# Patient Record
Sex: FEMALE | Race: WHITE | NOT HISPANIC OR LATINO | Employment: FULL TIME | URBAN - NONMETROPOLITAN AREA
[De-identification: names, ages, dates, MRNs, and addresses within clinical notes are randomized per-mention and may not be internally consistent; named-entity substitution may affect disease eponyms.]

---

## 2017-03-02 ENCOUNTER — OFFICE VISIT (OUTPATIENT)
Dept: URGENT CARE | Facility: PHYSICIAN GROUP | Age: 32
End: 2017-03-02
Payer: OTHER MISCELLANEOUS

## 2017-03-02 VITALS
HEART RATE: 76 BPM | DIASTOLIC BLOOD PRESSURE: 70 MMHG | WEIGHT: 156 LBS | TEMPERATURE: 98.7 F | SYSTOLIC BLOOD PRESSURE: 110 MMHG | BODY MASS INDEX: 25.07 KG/M2 | OXYGEN SATURATION: 96 % | RESPIRATION RATE: 16 BRPM | HEIGHT: 66 IN

## 2017-03-02 DIAGNOSIS — J40 BRONCHITIS: ICD-10-CM

## 2017-03-02 PROCEDURE — 99202 OFFICE O/P NEW SF 15 MIN: CPT | Performed by: NURSE PRACTITIONER

## 2017-03-02 RX ORDER — AZITHROMYCIN 250 MG/1
TABLET, FILM COATED ORAL
Qty: 6 TAB | Refills: 0 | Status: SHIPPED | OUTPATIENT
Start: 2017-03-02

## 2017-03-02 RX ORDER — CODEINE PHOSPHATE AND GUAIFENESIN 10; 100 MG/5ML; MG/5ML
5-10 SOLUTION ORAL EVERY 6 HOURS PRN
Qty: 90 ML | Refills: 0 | Status: SHIPPED | OUTPATIENT
Start: 2017-03-02

## 2017-03-02 ASSESSMENT — ENCOUNTER SYMPTOMS
SPUTUM PRODUCTION: 1
FEVER: 0
MYALGIAS: 0
SHORTNESS OF BREATH: 0
RHINORRHEA: 1
VOMITING: 0
NAUSEA: 0
COUGH: 1
DIARRHEA: 0
WEAKNESS: 0
SORE THROAT: 0
CHILLS: 0

## 2017-03-02 ASSESSMENT — COPD QUESTIONNAIRES: COPD: 0

## 2017-03-02 NOTE — MR AVS SNAPSHOT
"Christine Mae   3/2/2017 4:40 PM   Office Visit   MRN: [id number]    Department:  [facility]   Dept Phone:  [phone]    Description:  Female : 1985   Provider:  JACK Farrell           Reason for Visit     Cough productive      Allergies as of 3/2/2017     Allergen Noted Reactions    Erythromycin 2017         You were diagnosed with     Bronchitis   [[id number]]         Vital Signs     Blood Pressure Pulse Temperature Respirations Height Weight    110/70 mmHg 76 37.1 °C (98.7 °F) 16 1.676 m (5' 6\") 70.761 kg (156 lb)    Body Mass Index Oxygen Saturation Smoking Status             25.19 kg/m2 96% Former Smoker         Basic Information     Date Of Birth Sex Race Ethnicity Preferred Language    1985 Female White Non- English      Health Maintenance     Patient has no pending health maintenance at this time      Current Immunizations     No immunizations on file.      Below and/or attached are the medications your provider expects you to take. Review all of your home medications and newly ordered medications with your provider and/or pharmacist. Follow medication instructions as directed by your provider and/or pharmacist. Please keep your medication list with you and share with your provider. Update the information when medications are discontinued, doses are changed, or new medications (including over-the-counter products) are added; and carry medication information at all times in the event of emergency situations     Allergies:  ERYTHROMYCIN - (reactions not documented)               Medications  Valid as of: 2017 -  5:16 PM    Generic Name Brand Name Tablet Size Instructions for use    Azithromycin (Tab) ZITHROMAX 250 MG Take as directed        Guaifenesin-Codeine (Solution) ROBITUSSIN -10 mg/5mL Take 5-10 mL by mouth every 6 hours as needed for Cough.        .                 Medicines prescribed today were sent to:     [id number] " - [place], [place] - [address] [facility] [address] [facility]    [address] [place] [address]    Phone: [phone] Fax: [phone]    Open 24 Hours?: No      Medication refill instructions:       If your prescription bottle indicates you have medication refills left, it is not necessary to call your provider’s office. Please contact your pharmacy and they will refill your medication.    If your prescription bottle indicates you do not have any refills left, you may request refills at any time through one of the following ways: The online Adskom system (except Urgent Care), by calling your provider’s office, or by asking your pharmacy to contact your provider’s office with a refill request. Medication refills are processed only during regular business hours and may not be available until the next business day. Your provider may request additional information or to have a follow-up visit with you prior to refilling your medication.   *Please Note: Medication refills are assigned a new Rx number when refilled electronically. Your pharmacy may indicate that no refills were authorized even though a new prescription for the same medication is available at the pharmacy. Please request the medicine by name with the pharmacy before contacting your provider for a refill.           A[employer] Access Code: [email or web address]  Expires: 4/1/2017  5:16 PM    A[employer]kom  A secure, online tool to manage your health information     [employer]’s Adskom® is a secure, online tool that connects you to your personalized health information from the privacy of your home -- day or night - making it very easy for you to manage your healthcare. Once the activation process is completed, you can even access your medical information using the Adskom rayne, which is available for free in the Apple Rayne store or Google Play store.     Adskom provides the following levels of access (as shown below):   My Chart Features   [facility]  Primary Care Doctor [facility]  Specialists [facility]  Urgent  Care Non-[facility]  Primary Care  Doctor   Email your healthcare team securely and privately 24/7 X X X    Manage appointments: schedule your next appointment; view details of past/upcoming appointments X      Request prescription refills. X      View recent personal medical records, including lab and immunizations X X X X   View health record, including health history, allergies, medications X X X X   Read reports about your outpatient visits, procedures, consult and ER notes X X X X   See your discharge summary, which is a recap of your hospital and/or ER visit that includes your diagnosis, lab results, and care plan. X X       How to register for VYRE Limited:  1. Go to  [email or web address].  2. Click on the Sign Up Now box, which takes you to the New Member Sign Up page. You will need to provide the following information:  a. Enter your VYRE Limited Access Code exactly as it appears at the top of this page. (You will not need to use this code after you’ve completed the sign-up process. If you do not sign up before the expiration date, you must request a new code.)   b. Enter your date of birth.   c. Enter your home email address.   d. Click Submit, and follow the next screen’s instructions.  3. Create a VYRE Limited ID. This will be your VYRE Limited login ID and cannot be changed, so think of one that is secure and easy to remember.  4. Create a VYRE Limited password. You can change your password at any time.  5. Enter your Password Reset Question and Answer. This can be used at a later time if you forget your password.   6. Enter your e-mail address. This allows you to receive e-mail notifications when new information is available in VYRE Limited.  7. Click Sign Up. You can now view your health information.    For assistance activating your VYRE Limited account, call [phone]

## 2017-03-03 NOTE — PROGRESS NOTES
"Subjective:      Christine Mae is a 31 y.o. female who presents with Cough            HPI Comments: Medications, Allergies and Prior Medical Hx reviewed and updated in [facility].with patient/family today         Cough  This is a new problem. The current episode started more than 1 month ago. The problem has been waxing and waning. Associated symptoms include rhinorrhea. Pertinent negatives include no chills, ear pain, fever, myalgias, sore throat or shortness of breath. She has tried OTC cough suppressant for the symptoms. The treatment provided mild relief. There is no history of asthma, COPD or emphysema.       Review of Systems   Constitutional: Positive for malaise/fatigue. Negative for fever and chills.   HENT: Positive for congestion and rhinorrhea. Negative for ear discharge, ear pain and sore throat.    Respiratory: Positive for cough and sputum production. Negative for shortness of breath.    Gastrointestinal: Negative for nausea, vomiting and diarrhea.   Musculoskeletal: Negative for myalgias.   Neurological: Negative for weakness.          Objective:     /70 mmHg  Pulse 76  Temp(Src) 37.1 °C (98.7 °F)  Resp 16  Ht 1.676 m (5' 6\")  Wt 70.761 kg (156 lb)  BMI 25.19 kg/m2  SpO2 96%     Physical Exam   Constitutional: She appears well-developed and well-nourished. No distress.   HENT:   Head: Normocephalic and atraumatic.   Right Ear: Tympanic membrane and ear canal normal.   Left Ear: Tympanic membrane and ear canal normal.   Nose: Rhinorrhea present.   Mouth/Throat: Uvula is midline and mucous membranes are normal. Posterior oropharyngeal edema and posterior oropharyngeal erythema present. No oropharyngeal exudate.   Eyes: Conjunctivae are normal. Pupils are equal, round, and reactive to light.   Neck: Neck supple.   Cardiovascular: Normal rate, regular rhythm and normal heart sounds.    Pulmonary/Chest: Effort normal and breath sounds normal. No respiratory distress. She has no wheezes. "   Lymphadenopathy:     She has cervical adenopathy.   Neurological: She is alert.   Skin: Skin is warm and dry.   Psychiatric: She has a normal mood and affect. Her behavior is normal.   Vitals reviewed.              Assessment/Plan:     1. Bronchitis  guaifenesin-codeine (CHERATUSSIN AC) Solution oral solution    azithromycin (ZITHROMAX) 250 MG Tab       Do not drink alcohol or operate machinery with this medication  Pt reviewed on [place]  Aware,  no remarkable controlled substance prescription documentation noted    Warm compresses to eyes, good hand washing, change towels, washcloths, pillow cases, and eye makeup  Do not wear contacts until sx resolve  Pt will go to the ER for worsening or changing symptoms as discussed, pain, pain with eye movement, redness of eyelids or surrounding area, or vision changes  Follow-up with your primary care provider or return here if not improving in 2-3 days   Discharge instructions discussed with pt/family who verbalize understanding and agreement with poc

## 2017-05-08 ENCOUNTER — OFFICE VISIT (OUTPATIENT)
Dept: URGENT CARE | Facility: PHYSICIAN GROUP | Age: 32
End: 2017-05-08
Payer: OTHER MISCELLANEOUS

## 2017-05-08 VITALS
BODY MASS INDEX: 24.22 KG/M2 | WEIGHT: 150 LBS | HEART RATE: 76 BPM | RESPIRATION RATE: 14 BRPM | OXYGEN SATURATION: 98 % | TEMPERATURE: 98.7 F | SYSTOLIC BLOOD PRESSURE: 110 MMHG | DIASTOLIC BLOOD PRESSURE: 70 MMHG

## 2017-05-08 DIAGNOSIS — G47.00 INSOMNIA, UNSPECIFIED TYPE: ICD-10-CM

## 2017-05-08 DIAGNOSIS — H69.93 EUSTACHIAN TUBE DYSFUNCTION, BILATERAL: Primary | ICD-10-CM

## 2017-05-08 DIAGNOSIS — J30.9 ALLERGIC RHINITIS, UNSPECIFIED ALLERGIC RHINITIS TRIGGER, UNSPECIFIED RHINITIS SEASONALITY: ICD-10-CM

## 2017-05-08 PROCEDURE — 99214 OFFICE O/P EST MOD 30 MIN: CPT | Performed by: PHYSICIAN ASSISTANT

## 2017-05-08 RX ORDER — METHYLPREDNISOLONE 4 MG/1
4 TABLET ORAL SEE ADMIN INSTRUCTIONS
Qty: 21 TAB | Refills: 0 | Status: SHIPPED | OUTPATIENT
Start: 2017-05-08

## 2017-05-08 RX ORDER — HYDROXYZINE HYDROCHLORIDE 25 MG/1
25 TABLET, FILM COATED ORAL 3 TIMES DAILY PRN
Qty: 30 TAB | Refills: 0 | Status: SHIPPED | OUTPATIENT
Start: 2017-05-08

## 2017-05-08 NOTE — MR AVS SNAPSHOT
Christine Mae   2017 5:10 PM   Office Visit   MRN: [id number]    Department:  [facility]   Dept Phone:  [phone]    Description:  Female : 1985   Provider:  Horace Gaming PA-C           Reason for Visit     URI Cold Sx      Allergies as of 2017     Allergen Noted Reactions    Erythromycin 2017         You were diagnosed with     Eustachian tube dysfunction, bilateral   [[id number]]  -  Primary     Allergic rhinitis, unspecified allergic rhinitis trigger, unspecified rhinitis seasonality   [[id number]]       Insomnia, unspecified type   [[id number]]         Vital Signs     Blood Pressure Pulse Temperature Respirations Weight Oxygen Saturation    110/70 mmHg 76 37.1 °C (98.7 °F) 14 68.04 kg (150 lb) 98%    Smoking Status                   Former Smoker           Basic Information     Date Of Birth Sex Race Ethnicity Preferred Language    1985 Female White Non- English      Health Maintenance        Date Due Completion Dates    IMM DTaP/Tdap/Td Vaccine (1 - Tdap) 2006 ---    PAP SMEAR 2008 ---            Current Immunizations     No immunizations on file.      Below and/or attached are the medications your provider expects you to take. Review all of your home medications and newly ordered medications with your provider and/or pharmacist. Follow medication instructions as directed by your provider and/or pharmacist. Please keep your medication list with you and share with your provider. Update the information when medications are discontinued, doses are changed, or new medications (including over-the-counter products) are added; and carry medication information at all times in the event of emergency situations     Allergies:  ERYTHROMYCIN - (reactions not documented)               Medications  Valid as of: May 08, 2017 -  5:48 PM    Generic Name Brand Name Tablet Size Instructions for use    Azithromycin (Tab) ZITHROMAX 250 MG Take as directed        Guaifenesin-Codeine (Solution) ROBITUSSIN -10 mg/5mL Take 5-10 mL by mouth every 6 hours as needed for Cough.        HydrOXYzine HCl (Tab) ATARAX 25 MG Take 1 Tab by mouth 3 times a day as needed for Anxiety (and sleep).        MethylPREDNISolone (Tablet Therapy Pack) MEDROL DOSEPAK 4 MG Take 1 Tab by mouth See Admin Instructions.        .                 Medicines prescribed today were sent to:     [facility] [address] [place] [address], [place] - [address] [facility] [address] [place]    [address] [facility] [address] [facility] [place] [address]    Phone: [phone] Fax: [phone]    Open 24 Hours?: No      Medication refill instructions:       If your prescription bottle indicates you have medication refills left, it is not necessary to call your provider’s office. Please contact your pharmacy and they will refill your medication.    If your prescription bottle indicates you do not have any refills left, you may request refills at any time through one of the following ways: The online Nanushka system (except Urgent Care), by calling your provider’s office, or by asking your pharmacy to contact your provider’s office with a refill request. Medication refills are processed only during regular business hours and may not be available until the next business day. Your provider may request additional information or to have a follow-up visit with you prior to refilling your medication.   *Please Note: Medication refills are assigned a new Rx number when refilled electronically. Your pharmacy may indicate that no refills were authorized even though a new prescription for the same medication is available at the pharmacy. Please request the medicine by name with the pharmacy before contacting your provider for a refill.           Nanushka Access Code: [email or web address]  Expires: 6/7/2017  5:48 PM    Nanushka  A secure, online tool to manage your health information     [employer]’s Nanushka® is a secure, online tool that  connects you to your personalized health information from the privacy of your home -- day or night - making it very easy for you to manage your healthcare. Once the activation process is completed, you can even access your medical information using the Managed by Q rayne, which is available for free in the Apple Rayne store or Google Play store.     Managed by Q provides the following levels of access (as shown below):   My Chart Features   [facility] Primary Care Doctor [facility]  Specialists [facility]  Urgent  Care Non-[facility]  Primary Care  Doctor   Email your healthcare team securely and privately 24/7 X X X    Manage appointments: schedule your next appointment; view details of past/upcoming appointments X      Request prescription refills. X      View recent personal medical records, including lab and immunizations X X X X   View health record, including health history, allergies, medications X X X X   Read reports about your outpatient visits, procedures, consult and ER notes X X X X   See your discharge summary, which is a recap of your hospital and/or ER visit that includes your diagnosis, lab results, and care plan. X X       How to register for Managed by Q:  1. Go to  [email or web address].  2. Click on the Sign Up Now box, which takes you to the New Member Sign Up page. You will need to provide the following information:  a. Enter your Managed by Q Access Code exactly as it appears at the top of this page. (You will not need to use this code after you’ve completed the sign-up process. If you do not sign up before the expiration date, you must request a new code.)   b. Enter your date of birth.   c. Enter your home email address.   d. Click Submit, and follow the next screen’s instructions.  3. Create a Managed by Q ID. This will be your Managed by Q login ID and cannot be changed, so think of one that is secure and easy to remember.  4. Create a Managed by Q password. You can change your password at any time.  5. Enter your Password Reset Question  and Answer. This can be used at a later time if you forget your password.   6. Enter your e-mail address. This allows you to receive e-mail notifications when new information is available in VendAsta.  7. Click Sign Up. You can now view your health information.    For assistance activating your VendAsta account, call [phone]

## 2017-05-09 NOTE — PROGRESS NOTES
Chief Complaint   Patient presents with   • URI     Cold Sx       HISTORY OF PRESENT ILLNESS: Patient is a 31 y.o. female who presents today because she has 2 complaints.    #1. She has a history of insomnia. She is not sure what medication she was given by her primary care provider in [place], but she is out of it and she is having difficulty sleeping. She is having difficulty getting to sleep.    #2. She has nasal congestion, mild cough, mild sore throat, significant bilateral ear pain and plugging and popping sensation. She has not been taking anything for her symptoms. Denies any fevers, chills, nausea, vomiting or diarrhea.    There are no active problems to display for this patient.      Allergies:Erythromycin    Current Outpatient Prescriptions Ordered in [facility]   Medication Sig Dispense Refill   • MethylPREDNISolone (MEDROL DOSEPAK) 4 MG Tablet Therapy Pack Take 1 Tab by mouth See Admin Instructions. 21 Tab 0   • hydrOXYzine (ATARAX) 25 MG Tab Take 1 Tab by mouth 3 times a day as needed for Anxiety (and sleep). 30 Tab 0   • guaifenesin-codeine (CHERATUSSIN AC) Solution oral solution Take 5-10 mL by mouth every 6 hours as needed for Cough. 90 mL 0   • azithromycin (ZITHROMAX) 250 MG Tab Take as directed 6 Tab 0     No current Epic-ordered facility-administered medications on file.       No past medical history on file.    Social History   Substance Use Topics   • Smoking status: Former Smoker   • Smokeless tobacco: Never Used   • Alcohol Use: Yes       No family status information on file.   No family history on file.    ROS:  Review of Systems   Constitutional: Negative for fever, chills, weight loss and malaise/fatigue.   HENT: Positive for ear pain, no nosebleeds, positive for nasal and sinus congestion, sore throat and no neck pain.    Eyes: Negative for blurred vision.   Respiratory: Positive for minimal cough, no sputum production, shortness of breath and wheezing.    Cardiovascular: Negative for chest  pain, palpitations, orthopnea and leg swelling.   Gastrointestinal: Negative for heartburn, nausea, vomiting and abdominal pain.   Genitourinary: Negative for dysuria, urgency and frequency.     Exam:  Blood pressure 110/70, pulse 76, temperature 37.1 °C (98.7 °F), resp. rate 14, weight 68.04 kg (150 lb), SpO2 98 %.  General:  Well nourished, well developed female in NAD  Head:Normocephalic, atraumatic  Eyes: PERRLA, EOM within normal limits, no conjunctival injection, no scleral icterus, visual fields and acuity grossly intact.  Ears: Normal shape and symmetry, no tenderness, no discharge. External canals are without any significant edema or erythema. Tympanic membranes are without any inflammation, leg amount of cloudy fluid behind the tympanic membranes bilaterally. Gross auditory acuity is intact  Nose: Symmetrical without tenderness, no discharge. Nasal mucosa is pale and edematous bilaterally  Mouth: reasonable hygiene, no erythema exudates or tonsillar enlargement.  Neck: no masses, range of motion within normal limits, no tracheal deviation. No obvious thyroid enlargement.  Pulmonary: chest is symmetrical with respiration, no wheezes, crackles, or rhonchi.  Cardiovascular: regular rate and rhythm without murmurs, rubs, or gallops.  Extremities: no clubbing, cyanosis, or edema.    Please note that this dictation was created using voice recognition software. I have made every reasonable attempt to correct obvious errors, but I expect that there are errors of grammar and possibly content that I did not discover before finalizing the note.    Assessment/Plan:  1. Eustachian tube dysfunction, bilateral  MethylPREDNISolone (MEDROL DOSEPAK) 4 MG Tablet Therapy Pack   2. Allergic rhinitis, unspecified allergic rhinitis trigger, unspecified rhinitis seasonality  MethylPREDNISolone (MEDROL DOSEPAK) 4 MG Tablet Therapy Pack   3. Insomnia, unspecified type  hydrOXYzine (ATARAX) 25 MG Tab   otc zyrtec D    Followup with  primary care in the next 7-10 days if not significantly improving, return to the urgent care or go to the emergency room sooner for any worsening of symptoms.

## 2017-11-01 ENCOUNTER — OFFICE VISIT (OUTPATIENT)
Dept: URGENT CARE | Facility: PHYSICIAN GROUP | Age: 32
End: 2017-11-01
Payer: OTHER MISCELLANEOUS

## 2017-11-01 VITALS
BODY MASS INDEX: 26.26 KG/M2 | WEIGHT: 163.4 LBS | RESPIRATION RATE: 20 BRPM | OXYGEN SATURATION: 98 % | SYSTOLIC BLOOD PRESSURE: 138 MMHG | HEART RATE: 118 BPM | DIASTOLIC BLOOD PRESSURE: 82 MMHG | TEMPERATURE: 98.7 F | HEIGHT: 66 IN

## 2017-11-01 DIAGNOSIS — O21.0 HYPEREMESIS GRAVIDARUM: ICD-10-CM

## 2017-11-01 PROCEDURE — 99202 OFFICE O/P NEW SF 15 MIN: CPT | Performed by: FAMILY MEDICINE

## 2017-11-01 RX ORDER — ONDANSETRON 4 MG/1
4 TABLET, FILM COATED ORAL EVERY 4 HOURS PRN
Qty: 45 TAB | Refills: 1 | Status: SHIPPED | OUTPATIENT
Start: 2017-11-01

## 2017-11-01 RX ORDER — BUPROPION HYDROCHLORIDE 100 MG/1
100 TABLET ORAL 2 TIMES DAILY
COMMUNITY

## 2017-11-01 RX ORDER — DOXYLAMINE SUCCINATE AND PYRIDOXINE HYDROCHLORIDE, DELAYED RELEASE TABLETS 10 MG/10 MG 10; 10 MG/1; MG/1
1-2 TABLET, DELAYED RELEASE ORAL EVERY 4 HOURS PRN
Qty: 60 TAB | Refills: 0 | Status: SHIPPED | OUTPATIENT
Start: 2017-11-01

## 2017-11-01 ASSESSMENT — ENCOUNTER SYMPTOMS
NAUSEA: 1
FEVER: 0
COUGH: 0
SHORTNESS OF BREATH: 0
WHEEZING: 0
BLOOD IN STOOL: 0
ABDOMINAL PAIN: 0
CHILLS: 0
VOMITING: 1
PALPITATIONS: 0
SENSORY CHANGE: 0
BACK PAIN: 0
CONSTIPATION: 0
DIARRHEA: 0
SORE THROAT: 0

## 2017-11-01 NOTE — LETTER
November 1, 2017         Patient: Christine Mae   YOB: 1985   Date of Visit: 11/1/2017           To Whom it May Concern:    Christine Mae was seen in my clinic on 11/1/2017. She may return to work on 11/3/17..    If you have any questions or concerns, please don't hesitate to call.        Sincerely,           Gen Zuniga M.D.  Electronically Signed

## 2017-11-01 NOTE — PATIENT INSTRUCTIONS
Hyperemesis Gravidarum  Hyperemesis gravidarum is a severe form of nausea and vomiting that happens during pregnancy. Hyperemesis is worse than morning sickness. It may cause you to have nausea or vomiting all day for many days. It may keep you from eating and drinking enough food and liquids. Hyperemesis usually occurs during the first half (the first 20 weeks) of pregnancy. It often goes away once a woman is in her second half of pregnancy. However, sometimes hyperemesis continues through an entire pregnancy.   CAUSES   The cause of this condition is not completely known but is thought to be related to changes in the body's hormones when pregnant. It could be from the high level of the pregnancy hormone or an increase in estrogen in the body.   SIGNS AND SYMPTOMS   · Severe nausea and vomiting.  · Nausea that does not go away.  · Vomiting that does not allow you to keep any food down.  · Weight loss and body fluid loss (dehydration).  · Having no desire to eat or not liking food you have previously enjoyed.  DIAGNOSIS   Your health care provider will do a physical exam and ask you about your symptoms. He or she may also order blood tests and urine tests to make sure something else is not causing the problem.   TREATMENT   You may only need medicine to control the problem. If medicines do not control the nausea and vomiting, you will be treated in the hospital to prevent dehydration, increased acid in the blood (acidosis), weight loss, and changes in the electrolytes in your body that may harm the unborn baby (fetus). You may need IV fluids.   HOME CARE INSTRUCTIONS   · Only take over-the-counter or prescription medicines as directed by your health care provider.  · Try eating a couple of dry crackers or toast in the morning before getting out of bed.  · Avoid foods and smells that upset your stomach.  · Avoid fatty and spicy foods.  · Eat 5-6 small meals a day.  · Do not drink when eating meals. Drink between  meals.  · For snacks, eat high-protein foods, such as cheese.  · Eat or suck on things that have jazz in them. Jazz helps nausea.  · Avoid food preparation. The smell of food can spoil your appetite.  · Avoid iron pills and iron in your multivitamins until after 3-4 months of being pregnant. However, consult with your health care provider before stopping any prescribed iron pills.  SEEK MEDICAL CARE IF:   · Your abdominal pain increases.  · You have a severe headache.  · You have vision problems.  · You are losing weight.  SEEK IMMEDIATE MEDICAL CARE IF:   · You are unable to keep fluids down.  · You vomit blood.  · You have constant nausea and vomiting.  · You have excessive weakness.  · You have extreme thirst.  · You have dizziness or fainting.  · You have a fever or persistent symptoms for more than 2-3 days.  · You have a fever and your symptoms suddenly get worse.  MAKE SURE YOU:   · Understand these instructions.  · Will watch your condition.  · Will get help right away if you are not doing well or get worse.     This information is not intended to replace advice given to you by your health care provider. Make sure you discuss any questions you have with your health care provider.     Document Released: 12/18/2006 Document Revised: 10/08/2014 Document Reviewed: 07/30/2014  [employer] Interactive Patient Education ©2016 [employer].

## 2017-11-01 NOTE — PROGRESS NOTES
Subjective:      Christine Mae is a 32 y.o. female who presents with Hyperemesis (8 weeks )            Subjective:     Christine Mae is a 32 y.o. female who presents for evaluation of nausea and vomiting. She is currently 8 weeks pregnant and the vomiting started few weeks ago. She has planned for  which is scheduled for next week. Patient describes nausea as severe and worse in the morning. Vomiting occurs few times a day. Vomitus is described as undigested food.   Patient denies hematemesis, melena, fever, alcohol overuse. Symptoms have been intermittent.  Evaluation to date has been none. Treatment to date has been none.                 Review of Systems   Constitutional: Positive for malaise/fatigue. Negative for chills and fever.   HENT: Negative for congestion, ear pain and sore throat.    Respiratory: Negative for cough, shortness of breath and wheezing.    Cardiovascular: Negative for chest pain and palpitations.   Gastrointestinal: Positive for nausea and vomiting. Negative for abdominal pain, blood in stool, constipation, diarrhea and melena.   Genitourinary: Negative for dysuria and urgency.   Musculoskeletal: Negative for back pain.   Skin: Negative for itching and rash.   Neurological: Negative for sensory change.       PMH:  has no past medical history on file.  MEDS:   Current Outpatient Prescriptions:   •  buPROPion (WELLBUTRIN) 100 MG Tab, Take 100 mg by mouth 2 times a day., Disp: , Rfl:   •  Doxylamine-Pyridoxine 10-10 MG Tablet Delayed Response delayed-release tablet, Take 1-2 Tabs by mouth every four hours as needed., Disp: 60 Tab, Rfl: 0  •  ondansetron (ZOFRAN) 4 MG Tab tablet, Take 1 Tab by mouth every four hours as needed for Nausea/Vomiting., Disp: 45 Tab, Rfl: 1  •  MethylPREDNISolone (MEDROL DOSEPAK) 4 MG Tablet Therapy Pack, Take 1 Tab by mouth See Admin Instructions., Disp: 21 Tab, Rfl: 0  •  hydrOXYzine (ATARAX) 25 MG Tab, Take 1 Tab by mouth 3 times a day as needed for Anxiety  "(and sleep)., Disp: 30 Tab, Rfl: 0  •  guaifenesin-codeine (CHERATUSSIN AC) Solution oral solution, Take 5-10 mL by mouth every 6 hours as needed for Cough., Disp: 90 mL, Rfl: 0  •  azithromycin (ZITHROMAX) 250 MG Tab, Take as directed, Disp: 6 Tab, Rfl: 0  ALLERGIES:   Allergies   Allergen Reactions   • Erythromycin      SURGHX:   Past Surgical History:   Procedure Laterality Date   • APPENDECTOMY       SOCHX:  reports that she has never smoked. She has never used smokeless tobacco. She reports that she does not drink alcohol or use drugs.  FH: Family history was reviewed, no pertinent findings to report     Objective:     /82   Pulse (!) 118   Temp 37.1 °C (98.7 °F)   Resp 20   Ht 1.676 m (5' 6\")   Wt 74.1 kg (163 lb 6.4 oz)   LMP 09/01/2017   SpO2 98%   Breastfeeding? No   BMI 26.37 kg/m²      Physical Exam   Constitutional: She is oriented to person, place, and time. She appears well-developed and well-nourished.   HENT:   Head: Normocephalic.   Right Ear: External ear normal.   Left Ear: External ear normal.   Eyes: EOM are normal. Pupils are equal, round, and reactive to light. Right eye exhibits no discharge. Left eye exhibits no discharge.   Neck: Normal range of motion. Neck supple.   Cardiovascular: Normal rate, regular rhythm and normal heart sounds.    Pulmonary/Chest: Effort normal and breath sounds normal. No respiratory distress. She has no wheezes.   Abdominal: Soft. Bowel sounds are normal. There is no tenderness. There is no guarding.   Lymphadenopathy:     She has no cervical adenopathy.   Neurological: She is alert and oriented to person, place, and time.   Skin: Skin is warm and dry.               Assessment/Plan:     Assessment:    Hyperemesis Gravidarum      Plan:    Discussed the dx with the patient  Educational material distributed  PRN antiemetic per med orders  RTC PRN    Differential diagnoses, natural history, supportive care discussed  Indications for immediate care in an " emergency department, when to return to the urgent care, and when to follow up with primary care provider discussed at length. Patient espressed understanding and agreed to do so.

## 2024-09-23 ENCOUNTER — HOSPITAL ENCOUNTER (OUTPATIENT)
Facility: MEDICAL CENTER | Age: 39
End: 2024-09-23
Attending: STUDENT IN AN ORGANIZED HEALTH CARE EDUCATION/TRAINING PROGRAM
Payer: COMMERCIAL

## 2024-09-23 ENCOUNTER — OFFICE VISIT (OUTPATIENT)
Dept: URGENT CARE | Facility: CLINIC | Age: 39
End: 2024-09-23
Payer: COMMERCIAL

## 2024-09-23 VITALS
BODY MASS INDEX: 22.32 KG/M2 | TEMPERATURE: 98.4 F | SYSTOLIC BLOOD PRESSURE: 110 MMHG | HEART RATE: 75 BPM | OXYGEN SATURATION: 96 % | HEIGHT: 64 IN | WEIGHT: 130.73 LBS | DIASTOLIC BLOOD PRESSURE: 66 MMHG | RESPIRATION RATE: 14 BRPM

## 2024-09-23 DIAGNOSIS — N89.8 VAGINAL LESION: ICD-10-CM

## 2024-09-23 PROCEDURE — 3078F DIAST BP <80 MM HG: CPT | Performed by: STUDENT IN AN ORGANIZED HEALTH CARE EDUCATION/TRAINING PROGRAM

## 2024-09-23 PROCEDURE — 99204 OFFICE O/P NEW MOD 45 MIN: CPT | Performed by: STUDENT IN AN ORGANIZED HEALTH CARE EDUCATION/TRAINING PROGRAM

## 2024-09-23 PROCEDURE — 3074F SYST BP LT 130 MM HG: CPT | Performed by: STUDENT IN AN ORGANIZED HEALTH CARE EDUCATION/TRAINING PROGRAM

## 2024-09-23 PROCEDURE — 87529 HSV DNA AMP PROBE: CPT

## 2024-09-23 RX ORDER — DEXTROAMPHETAMINE SACCHARATE, AMPHETAMINE ASPARTATE, DEXTROAMPHETAMINE SULFATE AND AMPHETAMINE SULFATE 2.5; 2.5; 2.5; 2.5 MG/1; MG/1; MG/1; MG/1
5 TABLET ORAL 2 TIMES DAILY
COMMUNITY

## 2024-09-23 RX ORDER — ACYCLOVIR 400 MG/1
400 TABLET ORAL 3 TIMES DAILY
Qty: 21 TABLET | Refills: 0 | Status: SHIPPED | OUTPATIENT
Start: 2024-09-23 | End: 2024-09-30

## 2024-09-23 NOTE — PROGRESS NOTES
"Subjective:   Christine Mae is a 39 y.o. female who presents for Other (Vaginal bumps x 2 days, had them last month for the firs time ) and Fatigue      HPI:  39-year-old female presents to urgent care for vaginal lesion for the past 2 days and fatigue.  States that she had multiple vaginal lesions approximately 1 month ago that have resolved.  That was the first time she has ever had any vaginal lesions.  States that she has been on and off again with her ex-boyfriend but other than him no other sexual encounters.  Has not been diagnosed with HSV in the past.  But states that the first time she had these lesions it did resemble an HSV infection.  Lesion is painful.    Medications:    amphetamine-dextroamphetamine Tabs  azithromycin Tabs  buPROPion Tabs  Doxylamine-Pyridoxine Tbec  guaifenesin-codeine Soln  hydrOXYzine HCl Tabs  methylPREDNISolone Tbpk  ondansetron Tabs    Allergies: Erythromycin    Problem List: Christine Mae does not have a problem list on file.    Surgical History:  Past Surgical History:   Procedure Laterality Date    APPENDECTOMY         Past Social Hx: Christine Mae  reports that she has never smoked. She has never used smokeless tobacco. She reports current drug use. She reports that she does not drink alcohol.     Past Family Hx:  Christine Mae family history includes No Known Problems in her mother.     Problem list, medications, and allergies reviewed by myself today in Epic.     Objective:     /66   Pulse 75   Temp 36.9 °C (98.4 °F) (Temporal)   Resp 14   Ht 1.626 m (5' 4\")   Wt 59.3 kg (130 lb 11.7 oz)   SpO2 96%   BMI 22.44 kg/m²     Physical Exam  Vitals reviewed. Exam conducted with a chaperone present.   Constitutional:       General: She is not in acute distress.     Appearance: Normal appearance.   HENT:      Head: Normocephalic.      Right Ear: Tympanic membrane, ear canal and external ear normal.      Left Ear: Tympanic membrane, ear canal and external ear normal.    "   Nose: Nose normal.      Mouth/Throat:      Mouth: Mucous membranes are moist.   Eyes:      Conjunctiva/sclera: Conjunctivae normal.      Pupils: Pupils are equal, round, and reactive to light.   Cardiovascular:      Rate and Rhythm: Normal rate.      Pulses: Normal pulses.   Pulmonary:      Effort: Pulmonary effort is normal.   Genitourinary:         Comments: MA present for the entirety of the exam: Single ulceration present to the left pubic region.  Lesion is tender.  No active discharge or fluctuance.  No swelling.  Neurological:      Mental Status: She is alert.         Assessment/Plan:     Diagnosis and associated orders:     1. Vaginal lesion  HSV-1 AND HSV-2 SUBTYPE, PCR    acyclovir (ZOVIRAX) 400 MG tablet         Comments/MDM:     Patient's presentation physical exam findings show vaginal lesion.  Patient has associated fatigue.  Had similar lesions approximately 1 month ago.  Given today's exam, do of suspicion for HSV.  Viral swab conducted during exam.  Patient will be started on course of antiviral medication.  Patient be contacted once results return.         Differential diagnosis, natural history, supportive care, and indications for immediate follow-up discussed.    Advised the patient to follow-up with the primary care physician for recheck, reevaluation, and consideration of further management.    Please note that this dictation was created using voice recognition software. I have made a reasonable attempt to correct obvious errors, but I expect that there are errors of grammar and possibly content that I did not discover before finalizing the note.    Electronically signed by Jose Cruz Luna PA-C.

## 2024-09-23 NOTE — LETTER
September 23, 2024    To Whom It May Concern:         This is confirmation that Christine Tu attended her scheduled appointment with Jose Cruz Luna P.A.-C. on 9/23/24.  Excuse from work on 9/23/2024.         If you have any questions please do not hesitate to call me at the phone number listed below.    Sincerely,          Jose Cruz Luna P.A.-C.  [phone]

## 2024-09-26 LAB
HSV1 DNA CSF QL NAA+PROBE: NOT DETECTED
HSV2 DNA CSF QL NAA+PROBE: NOT DETECTED
SPECIMEN SOURCE: NORMAL

## 2025-04-17 ENCOUNTER — OFFICE VISIT (OUTPATIENT)
Dept: URGENT CARE | Facility: PHYSICIAN GROUP | Age: 40
End: 2025-04-17
Payer: COMMERCIAL

## 2025-04-17 VITALS
BODY MASS INDEX: 22.43 KG/M2 | DIASTOLIC BLOOD PRESSURE: 70 MMHG | HEIGHT: 64 IN | TEMPERATURE: 97.4 F | OXYGEN SATURATION: 98 % | WEIGHT: 131.4 LBS | SYSTOLIC BLOOD PRESSURE: 108 MMHG | HEART RATE: 81 BPM | RESPIRATION RATE: 16 BRPM

## 2025-04-17 DIAGNOSIS — Z77.120 MOLD EXPOSURE: ICD-10-CM

## 2025-04-17 DIAGNOSIS — H10.31 ACUTE CONJUNCTIVITIS OF RIGHT EYE, UNSPECIFIED ACUTE CONJUNCTIVITIS TYPE: ICD-10-CM

## 2025-04-17 DIAGNOSIS — R06.2 WHEEZE: ICD-10-CM

## 2025-04-17 DIAGNOSIS — R05.1 ACUTE COUGH: ICD-10-CM

## 2025-04-17 PROCEDURE — 99213 OFFICE O/P EST LOW 20 MIN: CPT | Performed by: FAMILY MEDICINE

## 2025-04-17 RX ORDER — BENZONATATE 100 MG/1
100 CAPSULE ORAL 3 TIMES DAILY PRN
Qty: 30 CAPSULE | Refills: 0 | Status: SHIPPED | OUTPATIENT
Start: 2025-04-17

## 2025-04-17 RX ORDER — METHYLPREDNISOLONE 4 MG/1
TABLET ORAL
Qty: 21 TABLET | Refills: 0 | Status: SHIPPED | OUTPATIENT
Start: 2025-04-17

## 2025-04-17 RX ORDER — ALBUTEROL SULFATE 90 UG/1
2 INHALANT RESPIRATORY (INHALATION) EVERY 4 HOURS PRN
Qty: 1 EACH | Refills: 0 | Status: SHIPPED | OUTPATIENT
Start: 2025-04-17

## 2025-04-17 ASSESSMENT — ENCOUNTER SYMPTOMS
VOMITING: 0
WEIGHT LOSS: 0
NAUSEA: 0
MYALGIAS: 0

## 2025-04-17 NOTE — LETTER
April 17, 2025         Patient: Christine Mae   YOB: 1985   Date of Visit: 4/17/2025           To Whom it May Concern:    Christine Mae was seen in my clinic on 4/17/2025. Please excuse from work 4/17 and 4/18/2025.      Sincerely,           Attila Montalvo M.D.  Electronically Signed

## 2025-04-18 NOTE — PROGRESS NOTES
"Rogelio Mae is a 39 y.o. female who presents with Eye Problem (Eye Swelling and Cough from black mold exposure ) and Cough            1 week eye redness, draining. Some itching. Seems to be alternating: initially right eye, then left eye 4 days ago, now right eye x 2 days. No contact lenses. Onset last night productive cough without blood in sputum. +wheeze. No SOB. No PMH asthma/pneumonia. Associated ST. Concerned symptoms are related to mold exposure from her bathroom. No other aggravating or alleviating factors.          Review of Systems   Constitutional:  Negative for malaise/fatigue and weight loss.   Gastrointestinal:  Negative for nausea and vomiting.   Musculoskeletal:  Negative for joint pain and myalgias.   Skin:  Negative for itching and rash.              Objective     /70   Pulse 81   Temp 36.3 °C (97.4 °F) (Temporal)   Resp 16   Ht 1.626 m (5' 4\")   Wt 59.6 kg (131 lb 6.4 oz)   SpO2 98%   BMI 22.55 kg/m²      Physical Exam  Constitutional:       General: She is not in acute distress.     Appearance: She is well-developed.   HENT:      Head: Normocephalic and atraumatic.      Right Ear: Tympanic membrane normal.      Left Ear: Tympanic membrane normal.   Eyes:      Extraocular Movements: Extraocular movements intact.      Pupils: Pupils are equal, round, and reactive to light.      Comments: R conjunctiva injected without exudate. No foreign body. No gross corneal lesion.     Cardiovascular:      Rate and Rhythm: Normal rate and regular rhythm.      Heart sounds: Normal heart sounds. No murmur heard.  Pulmonary:      Effort: Pulmonary effort is normal.      Breath sounds: Wheezing (few) and rhonchi present.   Skin:     General: Skin is warm and dry.      Findings: No rash.   Neurological:      Mental Status: She is alert.            1. Mold exposure  DX-CHEST-2 VIEWS    Referral to Allergy    methylPREDNISolone (MEDROL DOSEPAK) 4 MG Tablet Therapy Pack      2. Acute cough  " DX-CHEST-2 VIEWS    Referral to Allergy    methylPREDNISolone (MEDROL DOSEPAK) 4 MG Tablet Therapy Pack    benzonatate (TESSALON PERLES) 100 MG Cap      3. Acute conjunctivitis of right eye, unspecified acute conjunctivitis type  Referral to Allergy    methylPREDNISolone (MEDROL DOSEPAK) 4 MG Tablet Therapy Pack      4. Wheeze  albuterol 108 (90 Base) MCG/ACT Aero Soln inhalation aerosol        Differential diagnosis, natural history, supportive care, and indications for immediate follow-up were discussed.     Has antibiotic eye drops.     Chest xray pending.

## 2025-04-23 NOTE — Clinical Note
REFERRAL APPROVAL NOTICE         Sent on April 22, 2025                   Christine Mae  [address] [place] [address]                   Dear Ms. Mae,    After a careful review of the medical information and benefit coverage, [facility] has processed your referral. See below for additional details.    If applicable, you must be actively enrolled with your insurance for coverage of the authorized service. If you have any questions regarding your coverage, please contact your insurance directly.    REFERRAL INFORMATION   Referral #:  [id number]  Referred-To Provider    Referred-By Provider:  Allergy and Immunology    Attila Montalvo M.D.   [facility]      [address]  [facility] [address]  [phone] [address]  [facility] [address]  [phone]    Referral Start Date:  04/17/2025  Referral End Date:   04/17/2026             SCHEDULING  If you do not already have an appointment, please call [phone] to make an appointment.     MORE INFORMATION  If you do not already have a [employer] account, sign up at: [email or web address]  You can access your medical information, make appointments, see lab results, billing information, and more.  If you have questions regarding this referral, please contact  the [facility] Referrals department at:             [phone]. Monday - Friday 8:00AM - 5:00PM.     Sincerely,    [facility]

## 2025-04-25 ENCOUNTER — PHARMACY VISIT (OUTPATIENT)
Dept: PHARMACY | Facility: MEDICAL CENTER | Age: 40
End: 2025-04-25
Payer: COMMERCIAL

## 2025-04-25 PROCEDURE — RXMED WILLOW AMBULATORY MEDICATION CHARGE: Performed by: STUDENT IN AN ORGANIZED HEALTH CARE EDUCATION/TRAINING PROGRAM

## 2025-04-25 RX ORDER — OXYCODONE HYDROCHLORIDE 5 MG/1
TABLET ORAL
Qty: 6 TABLET | Refills: 0 | OUTPATIENT
Start: 2025-04-25

## 2025-04-25 NOTE — Clinical Note
REFERRAL APPROVAL NOTICE         Sent on April 25, 2025                   Christine Mae  [address]  [place] [address]                   Dear Ms. Mae,    After a careful review of the medical information and benefit coverage, [facility] has processed your referral. See below for additional details.    If applicable, you must be actively enrolled with your insurance for coverage of the authorized service. If you have any questions regarding your coverage, please contact your insurance directly.    REFERRAL INFORMATION   Referral #:  [id number]  Referred-To Provider    Referred-By Provider:  Allergy and Immunology    Attila Montalvo M.D.   [facility]      [address]  [address]  [place] [address] [phone] [address]  [place] [address]  [phone]    Referral Start Date:  04/25/2025  Referral End Date:   04/25/2026             SCHEDULING  If you do not already have an appointment, please call [phone] to make an appointment.     MORE INFORMATION  If you do not already have a [employer] account, sign up at: [email or web address]  You can access your medical information, make appointments, see lab results, billing information, and more.  If you have questions regarding this referral, please contact  the [facility] Referrals department at:             [phone]. Monday - Friday 8:00AM - 5:00PM.     Sincerely,    [facility]

## 2025-08-03 ENCOUNTER — OFFICE VISIT (OUTPATIENT)
Dept: URGENT CARE | Facility: PHYSICIAN GROUP | Age: 40
End: 2025-08-03
Payer: COMMERCIAL

## 2025-08-03 ENCOUNTER — HOSPITAL ENCOUNTER (OUTPATIENT)
Facility: MEDICAL CENTER | Age: 40
End: 2025-08-03
Payer: COMMERCIAL

## 2025-08-03 VITALS
OXYGEN SATURATION: 98 % | WEIGHT: 130.4 LBS | SYSTOLIC BLOOD PRESSURE: 130 MMHG | DIASTOLIC BLOOD PRESSURE: 82 MMHG | HEART RATE: 104 BPM | TEMPERATURE: 99.5 F | RESPIRATION RATE: 15 BRPM | BODY MASS INDEX: 22.26 KG/M2 | HEIGHT: 64 IN

## 2025-08-03 DIAGNOSIS — N89.8 VAGINAL ITCHING: ICD-10-CM

## 2025-08-03 PROCEDURE — 87660 TRICHOMONAS VAGIN DIR PROBE: CPT

## 2025-08-03 PROCEDURE — 87510 GARDNER VAG DNA DIR PROBE: CPT

## 2025-08-03 PROCEDURE — 3075F SYST BP GE 130 - 139MM HG: CPT

## 2025-08-03 PROCEDURE — 3079F DIAST BP 80-89 MM HG: CPT

## 2025-08-03 PROCEDURE — 87591 N.GONORRHOEAE DNA AMP PROB: CPT

## 2025-08-03 PROCEDURE — 87480 CANDIDA DNA DIR PROBE: CPT

## 2025-08-03 PROCEDURE — 87491 CHLMYD TRACH DNA AMP PROBE: CPT

## 2025-08-03 PROCEDURE — 99213 OFFICE O/P EST LOW 20 MIN: CPT

## 2025-08-03 RX ORDER — FLUCONAZOLE 150 MG/1
TABLET ORAL
Qty: 2 TABLET | Refills: 0 | Status: SHIPPED | OUTPATIENT
Start: 2025-08-03

## 2025-08-03 RX ORDER — DEXTROAMPHETAMINE SACCHARATE, AMPHETAMINE ASPARTATE, DEXTROAMPHETAMINE SULFATE AND AMPHETAMINE SULFATE 7.5; 7.5; 7.5; 7.5 MG/1; MG/1; MG/1; MG/1
30 TABLET ORAL 2 TIMES DAILY
COMMUNITY

## 2025-08-04 DIAGNOSIS — N89.8 VAGINAL ITCHING: ICD-10-CM

## 2025-08-04 LAB
CANDIDA DNA VAG QL PROBE+SIG AMP: NEGATIVE
G VAGINALIS DNA VAG QL PROBE+SIG AMP: NEGATIVE
T VAGINALIS DNA VAG QL PROBE+SIG AMP: NEGATIVE

## 2025-08-05 LAB
C TRACH DNA GENITAL QL NAA+PROBE: NEGATIVE
N GONORRHOEA DNA GENITAL QL NAA+PROBE: NEGATIVE
SPECIMEN SOURCE: NORMAL

## 2025-08-05 ASSESSMENT — ENCOUNTER SYMPTOMS
SHORTNESS OF BREATH: 0
HEADACHES: 0
DIARRHEA: 0
WEAKNESS: 0
WHEEZING: 0
COUGH: 0
NAUSEA: 0
FEVER: 0
CHILLS: 0
DIZZINESS: 0
ABDOMINAL PAIN: 0
VOMITING: 0
PALPITATIONS: 0

## 2025-08-09 ENCOUNTER — OFFICE VISIT (OUTPATIENT)
Dept: URGENT CARE | Facility: PHYSICIAN GROUP | Age: 40
End: 2025-08-09
Payer: COMMERCIAL

## 2025-08-09 ENCOUNTER — HOSPITAL ENCOUNTER (OUTPATIENT)
Facility: MEDICAL CENTER | Age: 40
End: 2025-08-09
Attending: FAMILY MEDICINE
Payer: COMMERCIAL

## 2025-08-09 VITALS
RESPIRATION RATE: 16 BRPM | TEMPERATURE: 98.2 F | BODY MASS INDEX: 21.34 KG/M2 | HEART RATE: 111 BPM | SYSTOLIC BLOOD PRESSURE: 118 MMHG | DIASTOLIC BLOOD PRESSURE: 72 MMHG | WEIGHT: 125 LBS | HEIGHT: 64 IN | OXYGEN SATURATION: 98 %

## 2025-08-09 DIAGNOSIS — N89.8 VAGINAL DISCHARGE: ICD-10-CM

## 2025-08-09 DIAGNOSIS — L60.1 ONYCHOLYSIS: ICD-10-CM

## 2025-08-09 DIAGNOSIS — Q38.3 TONGUE ABNORMALITY: ICD-10-CM

## 2025-08-09 PROCEDURE — 3078F DIAST BP <80 MM HG: CPT | Performed by: FAMILY MEDICINE

## 2025-08-09 PROCEDURE — 99214 OFFICE O/P EST MOD 30 MIN: CPT | Performed by: FAMILY MEDICINE

## 2025-08-09 PROCEDURE — 87480 CANDIDA DNA DIR PROBE: CPT

## 2025-08-09 PROCEDURE — 87660 TRICHOMONAS VAGIN DIR PROBE: CPT

## 2025-08-09 PROCEDURE — 87591 N.GONORRHOEAE DNA AMP PROB: CPT

## 2025-08-09 PROCEDURE — 3074F SYST BP LT 130 MM HG: CPT | Performed by: FAMILY MEDICINE

## 2025-08-09 PROCEDURE — 87510 GARDNER VAG DNA DIR PROBE: CPT

## 2025-08-09 PROCEDURE — 87491 CHLMYD TRACH DNA AMP PROBE: CPT

## 2025-08-09 RX ORDER — FLUCONAZOLE 150 MG/1
150 TABLET ORAL
Qty: 2 TABLET | Refills: 0 | Status: SHIPPED | OUTPATIENT
Start: 2025-08-09

## 2025-08-09 RX ORDER — NYSTATIN 100000 [USP'U]/ML
500000 SUSPENSION ORAL 4 TIMES DAILY
Qty: 140 ML | Refills: 0 | Status: SHIPPED | OUTPATIENT
Start: 2025-08-09 | End: 2025-08-16

## 2025-08-09 RX ORDER — LISDEXAMFETAMINE DIMESYLATE 20 MG/1
CAPSULE ORAL
COMMUNITY
Start: 2025-05-15

## 2025-08-11 DIAGNOSIS — N89.8 VAGINAL DISCHARGE: ICD-10-CM

## 2025-08-14 DIAGNOSIS — B35.1 ONYCHOMYCOSIS: Primary | ICD-10-CM
